# Patient Record
Sex: MALE | Race: ASIAN
[De-identification: names, ages, dates, MRNs, and addresses within clinical notes are randomized per-mention and may not be internally consistent; named-entity substitution may affect disease eponyms.]

---

## 2020-11-30 ENCOUNTER — HOSPITAL ENCOUNTER (EMERGENCY)
Dept: HOSPITAL 41 - JD.ED | Age: 70
Discharge: HOME | End: 2020-11-30
Payer: MEDICARE

## 2020-11-30 DIAGNOSIS — J12.89: ICD-10-CM

## 2020-11-30 DIAGNOSIS — U07.1: Primary | ICD-10-CM

## 2020-11-30 PROCEDURE — 80053 COMPREHEN METABOLIC PANEL: CPT

## 2020-11-30 PROCEDURE — 71275 CT ANGIOGRAPHY CHEST: CPT

## 2020-11-30 PROCEDURE — 99285 EMERGENCY DEPT VISIT HI MDM: CPT

## 2020-11-30 PROCEDURE — 84484 ASSAY OF TROPONIN QUANT: CPT

## 2020-11-30 PROCEDURE — 36415 COLL VENOUS BLD VENIPUNCTURE: CPT

## 2020-11-30 PROCEDURE — 83880 ASSAY OF NATRIURETIC PEPTIDE: CPT

## 2020-11-30 PROCEDURE — 86140 C-REACTIVE PROTEIN: CPT

## 2020-11-30 PROCEDURE — 93005 ELECTROCARDIOGRAM TRACING: CPT

## 2020-11-30 PROCEDURE — 71045 X-RAY EXAM CHEST 1 VIEW: CPT

## 2020-11-30 NOTE — EDM.PDOC
ED HPI GENERAL MEDICAL PROBLEM





- General


Chief Complaint: Respiratory Problem


Stated Complaint: SOB COUGH MANJEET JAMES CALLED IN


Time Seen by Provider: 11/30/20 19:18


Source of Information: Reports: Patient, Provider, RN Notes Reviewed





- History of Present Illness


INITIAL COMMENTS - FREE TEXT/NARRATIVE: 





 Jacob has sent patient over from the clinic for further eval of what looks 

like pneumonia on CXR done at clinic.  He is reported to have had covid 

infection about 6 weeks ago.  He reports that he has been short of breath this 

past week or so with exertion.  Occasional cough.  No recent fever or chills.  

No current or recent chest pain.  Hx of type 2 diabetes on metformin. He speaks 

fairly good English but not able to get a good understanding from him as to how 

ill he might have been back in October 6 to 7 wks ago.   





- Related Data


                                    Allergies











Allergy/AdvReac Type Severity Reaction Status Date / Time


 


No Known Allergies Allergy   Verified 11/30/20 19:13











Home Meds: 


                                    Home Meds





. [Unable to Verify Home Med List]  11/30/20 [History]











Past Medical History


Cardiovascular History: Reports: High Cholesterol





Social & Family History





- Tobacco Use


Tobacco Use Status *Q: Never Tobacco User





- Caffeine Use


Caffeine Use: Reports: Coffee, Tea





- Recreational Drug Use


Recreational Drug Use: No





ED ROS GENERAL





- Review of Systems


Review Of Systems: See Below


Constitutional: Reports: Other (no recent fever or chills).  Denies: Fever, 

Chills


HEENT: Reports: No Symptoms


Respiratory: Reports: Shortness of Breath (primarily with exertion)


Cardiovascular: Denies: Chest Pain


Endocrine: Reports: Fatigue


GI/Abdominal: Denies: Abdominal Pain, Nausea, Vomiting


Musculoskeletal: Reports: No Symptoms


Skin: Reports: No Symptoms


Neurological: Reports: No Symptoms





ED EXAM, GENERAL





- Physical Exam


Exam: See Below


General Appearance: Alert, No Apparent Distress (at rest)


Eye Exam: Bilateral Eye: PERRL


Head: Atraumatic.  No: Facial Swelling


Neck: Supple


Respiratory/Chest: No Respiratory Distress, Lungs Clear, Normal Breath Sounds, 

No Accessory Muscle Use.  No: Rales, Rhonchi, Wheezing


Cardiovascular: Tachycardia


GI/Abdominal: Soft, Non-Tender


Extremities: No: Pedal Edema, Leg Pain, Increased Warmth, Redness


Neurological: Alert, Oriented, No Motor/Sensory Deficits


Skin Exam: Warm, Dry, Normal Color, No Rash





Course





- Vital Signs


Last Recorded V/S: 


                                Last Vital Signs











Temp  97.6 F   11/30/20 19:07


 


Pulse  105 H  11/30/20 22:30


 


Resp  16   11/30/20 22:30


 


BP  130/86   11/30/20 22:30


 


Pulse Ox  97   11/30/20 22:30














- Orders/Labs/Meds


Orders: 


                               Active Orders 24 hr











 Category Date Time Status


 


 Ang Chest [CT] Stat Exams  11/30/20 19:37 Taken


 


 Chest 1V Frontal [CR] Stat Exams  11/30/20 19:35 Taken


 


 Peripheral IV Insertion Adult [OM.PC] Stat Oth  11/30/20 19:36 Ordered











Labs: 


                                Laboratory Tests











  11/30/20 11/30/20 11/30/20 Range/Units





  19:40 19:55 19:55 


 


Sodium  139    (136-145)  mEq/L


 


Potassium  4.4    (3.5-5.1)  mEq/L


 


Chloride  102    ()  mEq/L


 


Carbon Dioxide  28    (21-32)  mEq/L


 


Anion Gap  13.4    (5-15)  


 


BUN  15    (7-18)  mg/dL


 


Creatinine  1.1    (0.7-1.3)  mg/dL


 


Est Cr Clr Drug Dosing  58.42    mL/min


 


Estimated GFR (MDRD)  > 60    (>60)  mL/min


 


BUN/Creatinine Ratio  13.6 L    (14-18)  


 


Glucose  206 H    ()  mg/dL


 


Calcium  9.1    (8.5-10.1)  mg/dL


 


Total Bilirubin  0.5    (0.2-1.0)  mg/dL


 


AST  14 L    (15-37)  U/L


 


ALT  22    (16-63)  U/L


 


Alkaline Phosphatase  76    ()  U/L


 


Troponin I   < 0.017   (0.00-0.056)  ng/mL


 


C-Reactive Protein   0.9   (<1.0)  mg/dL


 


NT-Pro-B Natriuret Pep    102  (0-125)  pg/mL


 


Total Protein  7.4    (6.4-8.2)  g/dl


 


Albumin  3.8    (3.4-5.0)  g/dl


 


Globulin  3.6    gm/dL


 


Albumin/Globulin Ratio  1.1    (1-2)  











Meds: 


Medications














Discontinued Medications














Generic Name Dose Route Start Last Admin





  Trade Name Freq  PRN Reason Stop Dose Admin


 


Sodium Chloride  1,000 mls @ 999 mls/hr  11/30/20 20:15  11/30/20 20:56





  Normal Saline  IV   150 mls/hr





  ONETIME ALONDRA   Infusion


 


Sodium Chloride  100 mls @ 60 mls/min  11/30/20 20:41 





  Normal Saline  IV  11/30/20 20:42 





  NOW STA  


 


Iopamidol  100 ml  11/30/20 20:41 





  Isovue-370 (76%)  IVPUSH  11/30/20 20:42 





  ONETIME ONE  


 


Sodium Chloride  10 ml  11/30/20 19:36  11/30/20 20:00





  Saline Flush  FLUSH   10 ml





  ASDIRECTED PRN   Administration





  Keep Vein Open  


 


Sodium Chloride  10 ml  11/30/20 20:41 





  Saline Flush  FLUSH  11/30/20 20:42 





  NOW STA  














- Re-Assessments/Exams


Free Text/Narrative Re-Assessment/Exam: 





11/30/20 23:25


CXR did show bilat patchy lower lobe infiltrates.  CT Chest show interstitial 

and ground glass opacities both lungs, most prominent lung bases.  


WBC done at the clinic was 6,700.  D Dimer is reported to have been 0.8.  Creat.

 1.1, glucose 206, CRP 0.9,  , trop neg.  


Was able to visit with his wife after CT was done.  She sates he was in the 

[facility] for 8 days with "low oxygen".  "Not sure if he would survive 

when he went in so must have been quite ill.  He does not appear to have acute 

bacterial illness.  Do not know what his lungs looked like a month ago or 6 

weeks ago but supect he is now much improved, that this is still residual from 

his covid pneumonia of 6to 7 wks ago.  His sats are running 94 to 95 while here 

in the ED.  As noted his inflamatory markers checked today are now quite 

negative.  Discharge instr. as documented. 








Departure





- Departure


Time of Disposition: 22:21


Disposition: Home, Self-Care 01


Condition: Fair


Clinical Impression: 


 Pneumonia due to COVID-19 virus








- Discharge Information


Instructions:  COVID-19, Community-Acquired Pneumonia, Adult, Easy-to-Read


Referrals: 


Rickey James MD [Primary Care Provider] - 


Forms:  ED Department Discharge


Additional Instructions: 


Your Chest Xrays and CT scan of your lungs do still show some pneumonia in your 

lower lung fields.  This appears viral, apparently residual pneumonia and 

inflamation from your covid pneumonia 6 to 7 weeks ago.  Your shortness of 

breath with exertion will gradually get better.  See Dr James this coming 

Thursday or Friday for recheck,  call for appointment.  Return to ED [place] or 

here in [place] if breathing or other symptoms worsen in any significant way. 







Sepsis Event Note (ED)





- Evaluation


Sepsis Screening Result: No Definite Risk





- Focused Exam


Vital Signs: 


                                   Vital Signs











  Temp Pulse Resp BP Pulse Ox


 


 11/30/20 22:30   105 H  16  130/86  97


 


 11/30/20 20:59   109 H  18  135/89  97


 


 11/30/20 19:07  97.6 F  115 H  20  137/99 H  95














- My Orders


Last 24 Hours: 


My Active Orders





11/30/20 19:35


Chest 1V Frontal [CR] Stat 





11/30/20 19:36


Peripheral IV Insertion Adult [OM.PC] Stat 





11/30/20 19:37


Ang Chest [CT] Stat 














- Assessment/Plan


Last 24 Hours: 


My Active Orders





11/30/20 19:35


Chest 1V Frontal [CR] Stat 





11/30/20 19:36


Peripheral IV Insertion Adult [OM.PC] Stat 





11/30/20 19:37


Ang Chest [CT] Stat

## 2020-12-01 NOTE — CT
PROCEDURE INFORMATION:

Exam: CT Angiography Chest With Contrast

Exam date and time: 11/30/2020 7:59 PM

Age: 69 years old

Clinical indication: Cough and dyspnea and other: HX of covid 6 weeks ago d 
dimer 0.8



TECHNIQUE:

Imaging protocol: Computed tomographic angiography of the chest with intravenous
contrast.

3D rendering (Not supervised by radiologist): MIP and/or 3D reconstructed images
were created by the technologist.

Radiation optimization: All CT scans at this facility use at least one of these 
dose optimization techniques: automated exposure control; mA and/or kV 
adjustment per patient size (includes targeted exams where dose is matched to 
clinical indication); or iterative reconstruction.

Contrast material: ISOVUE 370; Contrast volume: 100 ml; Contrast route: 
INTRAVENOUS (IV);



COMPARISON:

CR Chest 1V Frontal 11/30/2020 7:22 PM



FINDINGS:

Pulmonary arteries: Normal. No pulmonary emboli.

Aorta: Unremarkable. No aortic aneurysm. No aortic dissection.

Lungs: There is a moderate amount of predominantly peripheral ground-glass and 
interstitial opacities throughout both lungs, most prominent in lung bases..

Pleural space: Unremarkable. No pneumothorax. No pleural effusion.

Heart: Unremarkable. No cardiomegaly. No pericardial effusion.

Lymph nodes: Unremarkable. No enlarged lymph nodes.

Bones/joints: Unremarkable. No acute fracture.

Soft tissues: Unremarkable.



IMPRESSION:

Interstitial and ground-glass opacities throughout both lungs. Atypical viral 
pneumonia could be considered in the appropriate clinical setting.



Thank you for allowing us to participate in the care of your patient.

Dictated and Authenticated by: Tristian Alvarez MD

11/30/2020 10:14 PM Central Time ([place] & [place])

ROB[facility]

## 2020-12-01 NOTE — CR
PROCEDURE INFORMATION:

Exam: XR Chest, 1 View

Exam date and time: 11/30/2020 7:22 PM

Age: 69 years old

Clinical indication: Cough and dyspnea



TECHNIQUE:

Imaging protocol: XR of the chest

Views: 1 view.



COMPARISON:

No relevant prior studies available.



FINDINGS:

Lungs: Multifocal patchy infiltrates in both lungs, left greater than right.

Pleural space: Unremarkable. No pleural effusion. No pneumothorax.

Heart/Mediastinum: Unremarkable. No cardiomegaly.

Bones/joints: Unremarkable.



IMPRESSION:

Multifocal infiltrates



Thank you for allowing us to participate in the care of your patient.

Dictated and Authenticated by: Tristian Alvarez MD

11/30/2020 8:55 PM Central Time ([place] & [place])

ROBYN